# Patient Record
(demographics unavailable — no encounter records)

---

## 2025-01-21 NOTE — HISTORY OF PRESENT ILLNESS
[de-identified] : pt has been doing well without any acute illness or injury, compliant with all BP meds Bp normal at home No complaints  [FreeTextEntry1] : CPE

## 2025-01-21 NOTE — PHYSICAL EXAM
[No Acute Distress] : no acute distress [Well Nourished] : well nourished [Well Developed] : well developed [Well-Appearing] : well-appearing [Normal Sclera/Conjunctiva] : normal sclera/conjunctiva [PERRL] : pupils equal round and reactive to light [EOMI] : extraocular movements intact [Normal Outer Ear/Nose] : the outer ears and nose were normal in appearance [No JVD] : no jugular venous distention [No Lymphadenopathy] : no lymphadenopathy [Supple] : supple [Thyroid Normal, No Nodules] : the thyroid was normal and there were no nodules present [No Respiratory Distress] : no respiratory distress  [No Accessory Muscle Use] : no accessory muscle use [Clear to Auscultation] : lungs were clear to auscultation bilaterally [Normal Rate] : normal rate  [Regular Rhythm] : with a regular rhythm [Normal S1, S2] : normal S1 and S2 [No Murmur] : no murmur heard [No Edema] : there was no peripheral edema [No Extremity Clubbing/Cyanosis] : no extremity clubbing/cyanosis [Soft] : abdomen soft [Non Tender] : non-tender [Non-distended] : non-distended [No Masses] : no abdominal mass palpated [Grossly Normal Strength/Tone] : grossly normal strength/tone [Coordination Grossly Intact] : coordination grossly intact [Normal Gait] : normal gait [Normal Affect] : the affect was normal [Normal Insight/Judgement] : insight and judgment were intact [No Joint Swelling] : no joint swelling [Alert and Oriented x3] : oriented to person, place, and time

## 2025-01-21 NOTE — PLAN
[FreeTextEntry1] :  HCM )  follow w/ dental, ophthalmology as recommended colonoscopy up to date Vaccination TDAP 2023 as per pt, pt refuses flu shot Diet low fat low salt diet regular exercise recommended  HTN under control con't amlodipine 5mg, telmisartan 80mg daily Lab ( venipuncture ) done today at this office. EKG done for HCM HTN at this office today;  NSR no acute ST-T changes

## 2025-01-21 NOTE — HEALTH RISK ASSESSMENT
[Very Good] : ~his/her~  mood as very good [No] : In the past 12 months have you used drugs other than those required for medical reasons? No [No falls in past year] : Patient reported no falls in the past year [0] : 2) Feeling down, depressed, or hopeless: Not at all (0) [PHQ-2 Negative - No further assessment needed] : PHQ-2 Negative - No further assessment needed [Never] : Never [HIV test declined] : HIV test declined [Hepatitis C test declined] : Hepatitis C test declined [None] : None [Employed] : employed [] :  [Feels Safe at Home] : Feels safe at home [Fully functional (bathing, dressing, toileting, transferring, walking, feeding)] : Fully functional (bathing, dressing, toileting, transferring, walking, feeding) [Fully functional (using the telephone, shopping, preparing meals, housekeeping, doing laundry, using] : Fully functional and needs no help or supervision to perform IADLs (using the telephone, shopping, preparing meals, housekeeping, doing laundry, using transportation, managing medications and managing finances) [Reports normal functional visual acuity (ie: able to read med bottle)] : Reports normal functional visual acuity [Seat Belt] :  uses seat belt [No Retinopathy] : No retinopathy [Patient reported colonoscopy was normal] : Patient reported colonoscopy was normal [With Family] : lives with family [de-identified] : sedentary, play table tennis sometimes [de-identified] : low fat [[id number]] : 0 [EyeExamDate] : 2024 [Change in mental status noted] : No change in mental status noted [Language] : denies difficulty with language [Behavior] : denies difficulty with behavior [High Risk Behavior] : no high risk behavior [Reports changes in hearing] : Reports no changes in hearing [Reports changes in vision] : Reports no changes in vision [Reports changes in dental health] : Reports no changes in dental health [ColonoscopyDate] : 2023 [HepatitisCDate] : neg 2023

## 2025-03-11 NOTE — HISTORY OF PRESENT ILLNESS
[FreeTextEntry8] : pt noticed 1 week ago, a bite babita with small pus formation on his Rt lower leg, so he touched and squeezed the pus from it, and then the area started getting swollen and red, so went to urgent care 2 days ago, started Abt Bactrim 1 tab bid and keflex 500mg 4 times daily for leg infection. pt had fever as well which got better as of last night. Today he did not feel feverish. The swelling and redness is still the same without any significant improvement. No N/V/D. pt is able to walk but has some pain when initiating to walk.

## 2025-03-11 NOTE — PHYSICAL EXAM
[No Acute Distress] : no acute distress [Well-Appearing] : well-appearing [Supple] : supple [No Respiratory Distress] : no respiratory distress  [No Accessory Muscle Use] : no accessory muscle use [Clear to Auscultation] : lungs were clear to auscultation bilaterally [Normal Rate] : normal rate  [Regular Rhythm] : with a regular rhythm [Normal Affect] : the affect was normal [Alert and Oriented x3] : oriented to person, place, and time [de-identified] : diffuse swelling and erythema of Rt lower leg lateral side, some tenderness.

## 2025-03-11 NOTE — REVIEW OF SYSTEMS
[Lower Ext Edema] : lower extremity edema [Negative] : Musculoskeletal [Chest Pain] : no chest pain [Palpitations] : no palpitations [de-identified] : swelling and erythema of Rt lower leg

## 2025-03-11 NOTE — PLAN
[FreeTextEntry1] : likely cellulitis  cont' current oral Abt, mupirocin ointment as Rx'd by urgent care, elevate leg as much as possible, and warm compresses, good oral hydration check lab Lab ( venipuncture ) done today at this office check US R/O DVT follow up in 2 days.

## 2025-03-13 NOTE — PLAN
[FreeTextEntry1] :  Worsening cellulitis, pt sent to ER for IV Abt  DVT neg in US 3/11/25 ( report given to pt )

## 2025-03-13 NOTE — REVIEW OF SYSTEMS
[Negative] : Gastrointestinal [de-identified] : worsening swelling, pain and erythema of rt lower leg

## 2025-03-13 NOTE — PHYSICAL EXAM
[No Acute Distress] : no acute distress [No Respiratory Distress] : no respiratory distress  [No Accessory Muscle Use] : no accessory muscle use [Clear to Auscultation] : lungs were clear to auscultation bilaterally [Normal Rate] : normal rate  [Regular Rhythm] : with a regular rhythm [Alert and Oriented x3] : oriented to person, place, and time [de-identified] : worsening erythema swelling and tenderness on Rt lower leg, lateral aspect> medial

## 2025-03-13 NOTE — HISTORY OF PRESENT ILLNESS
[FreeTextEntry1] : follow up cellulitis of rt leg [de-identified] : pt reports no improvement in Rt leg infection, has more swelling erythema and pain, but no fever.